# Patient Record
Sex: MALE | Race: ASIAN | NOT HISPANIC OR LATINO
[De-identification: names, ages, dates, MRNs, and addresses within clinical notes are randomized per-mention and may not be internally consistent; named-entity substitution may affect disease eponyms.]

---

## 2022-09-06 ENCOUNTER — APPOINTMENT (OUTPATIENT)
Dept: UROLOGY | Facility: CLINIC | Age: 87
End: 2022-09-06

## 2022-09-06 VITALS
TEMPERATURE: 97.5 F | HEIGHT: 64 IN | WEIGHT: 155 LBS | HEART RATE: 89 BPM | DIASTOLIC BLOOD PRESSURE: 67 MMHG | SYSTOLIC BLOOD PRESSURE: 126 MMHG | BODY MASS INDEX: 26.46 KG/M2

## 2022-09-06 DIAGNOSIS — Z86.39 PERSONAL HISTORY OF OTHER ENDOCRINE, NUTRITIONAL AND METABOLIC DISEASE: ICD-10-CM

## 2022-09-06 DIAGNOSIS — Z78.9 OTHER SPECIFIED HEALTH STATUS: ICD-10-CM

## 2022-09-06 PROBLEM — Z00.00 ENCOUNTER FOR PREVENTIVE HEALTH EXAMINATION: Status: ACTIVE | Noted: 2022-09-06

## 2022-09-06 PROCEDURE — 99204 OFFICE O/P NEW MOD 45 MIN: CPT

## 2022-09-06 RX ORDER — COVID-19 MOLECULAR TEST ASSAY
KIT MISCELLANEOUS
Qty: 8 | Refills: 0 | Status: ACTIVE | COMMUNITY
Start: 2022-05-25

## 2022-09-06 RX ORDER — METFORMIN HYDROCHLORIDE 850 MG/1
850 TABLET, COATED ORAL
Qty: 90 | Refills: 0 | Status: ACTIVE | COMMUNITY
Start: 2022-08-15

## 2022-09-06 RX ORDER — CIPROFLOXACIN HYDROCHLORIDE 500 MG/1
500 TABLET, FILM COATED ORAL
Qty: 14 | Refills: 0 | Status: ACTIVE | COMMUNITY
Start: 2022-04-27

## 2022-09-06 RX ORDER — FUROSEMIDE 20 MG/1
20 TABLET ORAL
Refills: 0 | Status: ACTIVE | COMMUNITY

## 2022-09-06 RX ORDER — LISINOPRIL 30 MG/1
TABLET ORAL
Refills: 0 | Status: ACTIVE | COMMUNITY

## 2022-09-06 RX ORDER — ROSUVASTATIN CALCIUM 5 MG/1
TABLET, FILM COATED ORAL
Refills: 0 | Status: ACTIVE | COMMUNITY

## 2022-09-06 RX ORDER — FUROSEMIDE 20 MG/1
20 TABLET ORAL
Qty: 90 | Refills: 0 | Status: ACTIVE | COMMUNITY
Start: 2022-08-15

## 2022-09-06 RX ORDER — BACLOFEN 10 MG/1
10 TABLET ORAL
Qty: 14 | Refills: 0 | Status: ACTIVE | COMMUNITY
Start: 2022-05-12

## 2022-09-06 RX ORDER — LISINOPRIL 10 MG/1
10 TABLET ORAL
Qty: 90 | Refills: 0 | Status: ACTIVE | COMMUNITY
Start: 2022-08-15

## 2022-09-06 RX ORDER — MELOXICAM 15 MG/1
15 TABLET ORAL
Qty: 14 | Refills: 0 | Status: ACTIVE | COMMUNITY
Start: 2022-05-24

## 2022-09-06 RX ORDER — METFORMIN ER 750 MG 750 MG/1
750 TABLET ORAL
Refills: 0 | Status: ACTIVE | COMMUNITY

## 2022-09-06 RX ORDER — GABAPENTIN 300 MG/1
300 CAPSULE ORAL
Qty: 30 | Refills: 0 | Status: ACTIVE | COMMUNITY
Start: 2022-08-15

## 2022-09-06 RX ORDER — ATORVASTATIN CALCIUM 20 MG/1
20 TABLET, FILM COATED ORAL
Qty: 90 | Refills: 0 | Status: ACTIVE | COMMUNITY
Start: 2022-08-15

## 2022-09-06 RX ORDER — SUB-Q INSULIN DEVICE, 40 UNIT
EACH MISCELLANEOUS
Qty: 30 | Refills: 0 | Status: ACTIVE | COMMUNITY
Start: 2022-08-11

## 2022-09-06 RX ORDER — INSULIN ASPART 100 [IU]/ML
100 INJECTION, SOLUTION INTRAVENOUS; SUBCUTANEOUS
Qty: 20 | Refills: 0 | Status: ACTIVE | COMMUNITY
Start: 2022-08-11

## 2022-09-06 RX ORDER — BLOOD SUGAR DIAGNOSTIC
STRIP MISCELLANEOUS
Qty: 300 | Refills: 0 | Status: ACTIVE | COMMUNITY
Start: 2022-08-15

## 2022-09-06 RX ORDER — TAMSULOSIN HYDROCHLORIDE 0.4 MG/1
0.4 CAPSULE ORAL
Qty: 90 | Refills: 0 | Status: ACTIVE | COMMUNITY
Start: 2022-08-15

## 2022-09-06 NOTE — ASSESSMENT
[FreeTextEntry1] : Very pleasant 87-year-old-gentleman who presents for evaluation of elevated PSA, BPH\par -We discussed the potential etiologies of an elevated PSA, including but not limited to prostate cancer, urinary tract infections, prostatitis, BPH, and recent ejaculation.\par -Repeat PSA\par -urine culture\par -We discussed the differences between prostate MRI and a prostate biopsy for evaluation for prostate cancer.  We discussed the risks and benefits of both a prostate biopsy versus a prostate MRI, including the limitations of both.  We discussed the benefit of obtaining an MRI prior to a prostate biopsy with the advantage of being able to do a transperineal fusion biopsy after MRI.  After thorough discussion of the risks and benefits of each he would like to proceed with a prostate MRI in anticipation of a fusion biopsy\par -We discussed normal age-adjusted PSA ranges\par -We discussed the option for observation given his age as well.  Patient would like to consider this and may elect to proceed with observation after results of PSA return

## 2022-09-06 NOTE — HISTORY OF PRESENT ILLNESS
[FreeTextEntry1] : Very pleasant 87-year-old gentleman who presents for evaluation of elevated PSA found on recent blood test to 8.5.  Reports that he has never been told about an elevated PSA in the past. No suprapubic pain. No dysuria.  No increased urinary frequency or urgency.  Nocturia x 2.  He takes tamsulosin.  He reports undergoing an in office prostate procedure in 2007 with another urologist. No history of urinary tract infections or renal impairment. No gross hematuria.  No aggravating or alleviating factors. No history of urinary tract instrumentation in the past.\par \par No family history of  malignancy, including prostate cancer.  No family history of nephrolithiasis.  Quit smoking 2002.

## 2022-09-07 LAB — PSA SERPL-MCNC: 10.4 NG/ML

## 2022-09-12 LAB — BACTERIA UR CULT: ABNORMAL

## 2022-09-12 RX ORDER — AMOXICILLIN AND CLAVULANATE POTASSIUM 875; 125 MG/1; MG/1
875-125 TABLET, COATED ORAL TWICE DAILY
Qty: 28 | Refills: 0 | Status: ACTIVE | COMMUNITY
Start: 2022-09-12 | End: 1900-01-01

## 2022-10-07 ENCOUNTER — APPOINTMENT (OUTPATIENT)
Dept: UROLOGY | Facility: CLINIC | Age: 87
End: 2022-10-07

## 2022-10-07 VITALS
HEIGHT: 64 IN | BODY MASS INDEX: 26.46 KG/M2 | DIASTOLIC BLOOD PRESSURE: 67 MMHG | SYSTOLIC BLOOD PRESSURE: 125 MMHG | TEMPERATURE: 98.2 F | HEART RATE: 65 BPM | WEIGHT: 155 LBS

## 2022-10-07 DIAGNOSIS — N39.0 URINARY TRACT INFECTION, SITE NOT SPECIFIED: ICD-10-CM

## 2022-10-07 PROCEDURE — 99213 OFFICE O/P EST LOW 20 MIN: CPT

## 2022-10-07 NOTE — HISTORY OF PRESENT ILLNESS
Admit on mild pathway  decadron  statin  AC  Hold on remdesivir due to transaminitis  Bronchodilators  Ambulate patient  [None] : None [FreeTextEntry1] : Mr. Gore is a very pleasant 87 year old man here today for history of elevated PSA, UTI\par He reports feeling well since he was here last.\par Reports that he feels better after finishing Augmentin.\par Denies any hematuria, dysuria or urinary retention.\par Has not yet had prostate MRI due to authorization not being obtained yet.

## 2022-10-07 NOTE — ASSESSMENT
[FreeTextEntry1] : Mr. Gore is a very pleasant 87 year old man here today for history of elevated PSA, UTI\par He reports feeling well since he was here last.\par Reports that he feels better after finishing Augmentin.\par Denies any hematuria, dysuria or urinary retention.\par Has not yet had prostate MRI due to authorization not being obtained yet.\par Repeat UC today.\par Prostate MRI.\par RTO in 3 weeks.

## 2022-10-10 ENCOUNTER — NON-APPOINTMENT (OUTPATIENT)
Age: 87
End: 2022-10-10

## 2022-10-10 LAB — BACTERIA UR CULT: NORMAL

## 2022-10-28 ENCOUNTER — NON-APPOINTMENT (OUTPATIENT)
Age: 87
End: 2022-10-28

## 2022-11-01 ENCOUNTER — APPOINTMENT (OUTPATIENT)
Dept: UROLOGY | Facility: CLINIC | Age: 87
End: 2022-11-01

## 2023-02-10 ENCOUNTER — APPOINTMENT (OUTPATIENT)
Dept: UROLOGY | Facility: CLINIC | Age: 88
End: 2023-02-10
Payer: MEDICARE

## 2023-02-10 DIAGNOSIS — N13.8 BENIGN PROSTATIC HYPERPLASIA WITH LOWER URINARY TRACT SYMPMS: ICD-10-CM

## 2023-02-10 DIAGNOSIS — N40.1 BENIGN PROSTATIC HYPERPLASIA WITH LOWER URINARY TRACT SYMPMS: ICD-10-CM

## 2023-02-10 PROCEDURE — 99214 OFFICE O/P EST MOD 30 MIN: CPT | Mod: 95

## 2023-02-10 NOTE — HISTORY OF PRESENT ILLNESS
[FreeTextEntry1] : The patient-doctor relationship has been established in a face to face fashion via real time video/audio HIPAA compliant communication using telemedicine software.  He has requested care to be assessed and treated through telemedicine. He understands that there maybe limitations in this process and that he may need further follow up care in the office and/or hospital setting.\par \par Very pleasant 87-year-old gentleman who presents for follow-up of elevated PSA.  He was found to have an elevated PSA of 10.7 from September 2022.  A urine culture was positive at that time.  He was treated with antibiotics.  Repeat urine culture was negative.  Subsequent prostate MRI demonstrated an overall suspicion score of PI-RADS 4 in the setting of a 54 cc prostate.  He presents today to discuss these results as well as further management options.

## 2023-02-10 NOTE — ASSESSMENT
[FreeTextEntry1] : Very pleasant 87-year-old gentleman who presents for follow-up of elevated PSA, BPH\par -Prostate MRI images from [facility] radiology reviewed demonstrating overall suspicion score of PI-RADS 4 in the setting of a 54 cc prostate\par -PSA 10.7\par -Repeat urine culture\par -I discussed the need to perform a transrectal ultrasound-guided prostate biopsy with the patient.  I reviewed the potential etiologies of an elevated PSA with the patient, including but not limited to prostate cancer, benign prostatic hyperplasia (BPH), inflammation of the prostate, urinary tract infection (UTI), older age, and sexual intercourse. I discussed the risks of a prostate biopsy with the patient, including but not limited to pain, rectal bleeding, blood in the urine and semen, difficulty or inability to urinate, and infection. We discussed the procedure itself, including the need to place a transrectal ultrasound probe in the rectum and take systematic biopsies of the prostate gland after providing a local anesthetic agent.  I explained the stef-procedural preparations that the patient will need to take, including self-administration of an enema the day of the biopsy. He wishes to proceed and we will schedule the biopsy for the near future.

## 2023-03-07 ENCOUNTER — NON-APPOINTMENT (OUTPATIENT)
Age: 88
End: 2023-03-07

## 2023-03-09 ENCOUNTER — OUTPATIENT (OUTPATIENT)
Dept: OUTPATIENT SERVICES | Facility: HOSPITAL | Age: 88
LOS: 1 days | End: 2023-03-09
Payer: COMMERCIAL

## 2023-03-09 ENCOUNTER — APPOINTMENT (OUTPATIENT)
Dept: UROLOGY | Facility: CLINIC | Age: 88
End: 2023-03-09
Payer: MEDICARE

## 2023-03-09 VITALS — DIASTOLIC BLOOD PRESSURE: 69 MMHG | SYSTOLIC BLOOD PRESSURE: 178 MMHG

## 2023-03-09 VITALS — DIASTOLIC BLOOD PRESSURE: 69 MMHG | SYSTOLIC BLOOD PRESSURE: 138 MMHG | HEART RATE: 80 BPM

## 2023-03-09 DIAGNOSIS — R35.0 FREQUENCY OF MICTURITION: ICD-10-CM

## 2023-03-09 DIAGNOSIS — R93.5 ABNORMAL FINDINGS ON DIAGNOSTIC IMAGING OF OTHER ABDOMINAL REGIONS, INCLUDING RETROPERITONEUM: ICD-10-CM

## 2023-03-09 PROCEDURE — 55700: CPT | Mod: 22

## 2023-03-09 PROCEDURE — 76942 ECHO GUIDE FOR BIOPSY: CPT | Mod: 26,59

## 2023-03-09 PROCEDURE — 76377 3D RENDER W/INTRP POSTPROCES: CPT | Mod: 26

## 2023-03-09 PROCEDURE — 55700: CPT

## 2023-03-09 PROCEDURE — 76942 ECHO GUIDE FOR BIOPSY: CPT | Mod: 59

## 2023-03-10 ENCOUNTER — NON-APPOINTMENT (OUTPATIENT)
Age: 88
End: 2023-03-10

## 2023-03-10 ENCOUNTER — INPATIENT (INPATIENT)
Facility: HOSPITAL | Age: 88
LOS: 0 days | Discharge: HOME | End: 2023-03-11
Attending: UROLOGY | Admitting: UROLOGY
Payer: MEDICARE

## 2023-03-10 VITALS
RESPIRATION RATE: 20 BRPM | HEART RATE: 121 BPM | OXYGEN SATURATION: 96 % | SYSTOLIC BLOOD PRESSURE: 101 MMHG | TEMPERATURE: 100 F | DIASTOLIC BLOOD PRESSURE: 53 MMHG

## 2023-03-10 DIAGNOSIS — R31.9 HEMATURIA, UNSPECIFIED: ICD-10-CM

## 2023-03-10 DIAGNOSIS — Z95.5 PRESENCE OF CORONARY ANGIOPLASTY IMPLANT AND GRAFT: Chronic | ICD-10-CM

## 2023-03-10 DIAGNOSIS — Z90.79 ACQUIRED ABSENCE OF OTHER GENITAL ORGAN(S): Chronic | ICD-10-CM

## 2023-03-10 DIAGNOSIS — Z98.890 OTHER SPECIFIED POSTPROCEDURAL STATES: Chronic | ICD-10-CM

## 2023-03-10 LAB
ALBUMIN SERPL ELPH-MCNC: 3.5 G/DL — SIGNIFICANT CHANGE UP (ref 3.3–5)
ALP SERPL-CCNC: 71 U/L — SIGNIFICANT CHANGE UP (ref 40–120)
ALT FLD-CCNC: 15 U/L — SIGNIFICANT CHANGE UP (ref 4–41)
ANION GAP SERPL CALC-SCNC: 10 MMOL/L — SIGNIFICANT CHANGE UP (ref 7–14)
APPEARANCE UR: ABNORMAL
APTT BLD: 26.6 SEC — LOW (ref 27–36.3)
AST SERPL-CCNC: 22 U/L — SIGNIFICANT CHANGE UP (ref 4–40)
BACTERIA # UR AUTO: ABNORMAL
BASOPHILS # BLD AUTO: 0.08 K/UL — SIGNIFICANT CHANGE UP (ref 0–0.2)
BASOPHILS NFR BLD AUTO: 0.5 % — SIGNIFICANT CHANGE UP (ref 0–2)
BILIRUB SERPL-MCNC: 1.2 MG/DL — SIGNIFICANT CHANGE UP (ref 0.2–1.2)
BILIRUB UR-MCNC: NEGATIVE — SIGNIFICANT CHANGE UP
BUN SERPL-MCNC: 30 MG/DL — HIGH (ref 7–23)
CALCIUM SERPL-MCNC: 9.1 MG/DL — SIGNIFICANT CHANGE UP (ref 8.4–10.5)
CHLORIDE SERPL-SCNC: 100 MMOL/L — SIGNIFICANT CHANGE UP (ref 98–107)
CO2 SERPL-SCNC: 23 MMOL/L — SIGNIFICANT CHANGE UP (ref 22–31)
COLOR SPEC: YELLOW — SIGNIFICANT CHANGE UP
CREAT SERPL-MCNC: 1.63 MG/DL — HIGH (ref 0.5–1.3)
DIFF PNL FLD: ABNORMAL
EGFR: 41 ML/MIN/1.73M2 — LOW
EOSINOPHIL # BLD AUTO: 0.12 K/UL — SIGNIFICANT CHANGE UP (ref 0–0.5)
EOSINOPHIL NFR BLD AUTO: 0.8 % — SIGNIFICANT CHANGE UP (ref 0–6)
EPI CELLS # UR: 0 /HPF — SIGNIFICANT CHANGE UP (ref 0–5)
FLUAV AG NPH QL: SIGNIFICANT CHANGE UP
FLUBV AG NPH QL: SIGNIFICANT CHANGE UP
GLUCOSE BLDC GLUCOMTR-MCNC: 209 MG/DL — HIGH (ref 70–99)
GLUCOSE BLDC GLUCOMTR-MCNC: 236 MG/DL — HIGH (ref 70–99)
GLUCOSE SERPL-MCNC: 273 MG/DL — HIGH (ref 70–99)
GLUCOSE UR QL: ABNORMAL
HCT VFR BLD CALC: 37 % — LOW (ref 39–50)
HGB BLD-MCNC: 11.2 G/DL — LOW (ref 13–17)
HYALINE CASTS # UR AUTO: 2 /LPF — SIGNIFICANT CHANGE UP (ref 0–7)
IANC: 13.72 K/UL — HIGH (ref 1.8–7.4)
IMM GRANULOCYTES NFR BLD AUTO: 0.4 % — SIGNIFICANT CHANGE UP (ref 0–0.9)
INR BLD: 1.26 RATIO — HIGH (ref 0.88–1.16)
KETONES UR-MCNC: ABNORMAL
LACTATE BLDV-MCNC: 1.6 MMOL/L — SIGNIFICANT CHANGE UP (ref 0.5–2)
LEUKOCYTE ESTERASE UR-ACNC: ABNORMAL
LYMPHOCYTES # BLD AUTO: 0.57 K/UL — LOW (ref 1–3.3)
LYMPHOCYTES # BLD AUTO: 3.7 % — LOW (ref 13–44)
MCHC RBC-ENTMCNC: 24.3 PG — LOW (ref 27–34)
MCHC RBC-ENTMCNC: 30.3 GM/DL — LOW (ref 32–36)
MCV RBC AUTO: 80.3 FL — SIGNIFICANT CHANGE UP (ref 80–100)
MONOCYTES # BLD AUTO: 0.95 K/UL — HIGH (ref 0–0.9)
MONOCYTES NFR BLD AUTO: 6.1 % — SIGNIFICANT CHANGE UP (ref 2–14)
NEUTROPHILS # BLD AUTO: 13.72 K/UL — HIGH (ref 1.8–7.4)
NEUTROPHILS NFR BLD AUTO: 88.5 % — HIGH (ref 43–77)
NITRITE UR-MCNC: NEGATIVE — SIGNIFICANT CHANGE UP
NRBC # BLD: 0 /100 WBCS — SIGNIFICANT CHANGE UP (ref 0–0)
NRBC # FLD: 0 K/UL — SIGNIFICANT CHANGE UP (ref 0–0)
PH UR: 6.5 — SIGNIFICANT CHANGE UP (ref 5–8)
PLATELET # BLD AUTO: 335 K/UL — SIGNIFICANT CHANGE UP (ref 150–400)
POTASSIUM SERPL-MCNC: 4.9 MMOL/L — SIGNIFICANT CHANGE UP (ref 3.5–5.3)
POTASSIUM SERPL-SCNC: 4.9 MMOL/L — SIGNIFICANT CHANGE UP (ref 3.5–5.3)
PROT SERPL-MCNC: 6.6 G/DL — SIGNIFICANT CHANGE UP (ref 6–8.3)
PROT UR-MCNC: ABNORMAL
PROTHROM AB SERPL-ACNC: 14.6 SEC — HIGH (ref 10.5–13.4)
RBC # BLD: 4.61 M/UL — SIGNIFICANT CHANGE UP (ref 4.2–5.8)
RBC # FLD: 16.1 % — HIGH (ref 10.3–14.5)
RBC CASTS # UR COMP ASSIST: 606 /HPF — HIGH (ref 0–4)
RSV RNA NPH QL NAA+NON-PROBE: SIGNIFICANT CHANGE UP
SARS-COV-2 RNA SPEC QL NAA+PROBE: SIGNIFICANT CHANGE UP
SODIUM SERPL-SCNC: 133 MMOL/L — LOW (ref 135–145)
SP GR SPEC: 1.01 — SIGNIFICANT CHANGE UP (ref 1.01–1.05)
TROPONIN T, HIGH SENSITIVITY RESULT: 34 NG/L — SIGNIFICANT CHANGE UP
UROBILINOGEN FLD QL: SIGNIFICANT CHANGE UP
WBC # BLD: 15.5 K/UL — HIGH (ref 3.8–10.5)
WBC # FLD AUTO: 15.5 K/UL — HIGH (ref 3.8–10.5)
WBC UR QL: 253 /HPF — HIGH (ref 0–5)

## 2023-03-10 PROCEDURE — 71046 X-RAY EXAM CHEST 2 VIEWS: CPT | Mod: 26

## 2023-03-10 PROCEDURE — 99222 1ST HOSP IP/OBS MODERATE 55: CPT

## 2023-03-10 PROCEDURE — 99285 EMERGENCY DEPT VISIT HI MDM: CPT

## 2023-03-10 RX ORDER — CEFTRIAXONE 500 MG/1
1000 INJECTION, POWDER, FOR SOLUTION INTRAMUSCULAR; INTRAVENOUS ONCE
Refills: 0 | Status: DISCONTINUED | OUTPATIENT
Start: 2023-03-10 | End: 2023-03-10

## 2023-03-10 RX ORDER — LISINOPRIL 2.5 MG/1
5 TABLET ORAL DAILY
Refills: 0 | Status: DISCONTINUED | OUTPATIENT
Start: 2023-03-10 | End: 2023-03-11

## 2023-03-10 RX ORDER — GLUCAGON INJECTION, SOLUTION 0.5 MG/.1ML
1 INJECTION, SOLUTION SUBCUTANEOUS ONCE
Refills: 0 | Status: DISCONTINUED | OUTPATIENT
Start: 2023-03-10 | End: 2023-03-11

## 2023-03-10 RX ORDER — INSULIN LISPRO 100/ML
5 VIAL (ML) SUBCUTANEOUS
Refills: 0 | Status: DISCONTINUED | OUTPATIENT
Start: 2023-03-10 | End: 2023-03-11

## 2023-03-10 RX ORDER — ASPIRIN/CALCIUM CARB/MAGNESIUM 324 MG
81 TABLET ORAL DAILY
Refills: 0 | Status: DISCONTINUED | OUTPATIENT
Start: 2023-03-10 | End: 2023-03-11

## 2023-03-10 RX ORDER — SODIUM CHLORIDE 9 MG/ML
1000 INJECTION, SOLUTION INTRAVENOUS
Refills: 0 | Status: DISCONTINUED | OUTPATIENT
Start: 2023-03-10 | End: 2023-03-11

## 2023-03-10 RX ORDER — INSULIN LISPRO 100/ML
VIAL (ML) SUBCUTANEOUS
Refills: 0 | Status: DISCONTINUED | OUTPATIENT
Start: 2023-03-10 | End: 2023-03-11

## 2023-03-10 RX ORDER — CIPROFLOXACIN HYDROCHLORIDE 500 MG/1
500 TABLET, FILM COATED ORAL
Qty: 14 | Refills: 0 | Status: ACTIVE | COMMUNITY
Start: 2023-03-10 | End: 1900-01-01

## 2023-03-10 RX ORDER — DEXTROSE 50 % IN WATER 50 %
25 SYRINGE (ML) INTRAVENOUS ONCE
Refills: 0 | Status: DISCONTINUED | OUTPATIENT
Start: 2023-03-10 | End: 2023-03-11

## 2023-03-10 RX ORDER — TAMSULOSIN HYDROCHLORIDE 0.4 MG/1
1 CAPSULE ORAL
Qty: 0 | Refills: 0 | DISCHARGE

## 2023-03-10 RX ORDER — LISINOPRIL 2.5 MG/1
1 TABLET ORAL
Qty: 0 | Refills: 0 | DISCHARGE

## 2023-03-10 RX ORDER — DEXTROSE 50 % IN WATER 50 %
15 SYRINGE (ML) INTRAVENOUS ONCE
Refills: 0 | Status: DISCONTINUED | OUTPATIENT
Start: 2023-03-10 | End: 2023-03-11

## 2023-03-10 RX ORDER — ASPIRIN/CALCIUM CARB/MAGNESIUM 324 MG
0 TABLET ORAL
Qty: 0 | Refills: 0 | DISCHARGE

## 2023-03-10 RX ORDER — ACETAMINOPHEN 500 MG
975 TABLET ORAL ONCE
Refills: 0 | Status: COMPLETED | OUTPATIENT
Start: 2023-03-10 | End: 2023-03-10

## 2023-03-10 RX ORDER — INSULIN GLARGINE 100 [IU]/ML
16 INJECTION, SOLUTION SUBCUTANEOUS ONCE
Refills: 0 | Status: COMPLETED | OUTPATIENT
Start: 2023-03-10 | End: 2023-03-10

## 2023-03-10 RX ORDER — HEPARIN SODIUM 5000 [USP'U]/ML
5000 INJECTION INTRAVENOUS; SUBCUTANEOUS EVERY 8 HOURS
Refills: 0 | Status: DISCONTINUED | OUTPATIENT
Start: 2023-03-10 | End: 2023-03-11

## 2023-03-10 RX ORDER — METFORMIN HYDROCHLORIDE 850 MG/1
850 TABLET ORAL ONCE
Refills: 0 | Status: DISCONTINUED | OUTPATIENT
Start: 2023-03-10 | End: 2023-03-10

## 2023-03-10 RX ORDER — INSULIN ASPART 100 [IU]/ML
6 INJECTION, SOLUTION SUBCUTANEOUS
Qty: 0 | Refills: 0 | DISCHARGE

## 2023-03-10 RX ORDER — CEFTRIAXONE 500 MG/1
1000 INJECTION, POWDER, FOR SOLUTION INTRAMUSCULAR; INTRAVENOUS ONCE
Refills: 0 | Status: COMPLETED | OUTPATIENT
Start: 2023-03-10 | End: 2023-03-10

## 2023-03-10 RX ORDER — SIMVASTATIN 20 MG/1
1 TABLET, FILM COATED ORAL
Qty: 0 | Refills: 0 | DISCHARGE

## 2023-03-10 RX ORDER — SIMVASTATIN 20 MG/1
20 TABLET, FILM COATED ORAL AT BEDTIME
Refills: 0 | Status: DISCONTINUED | OUTPATIENT
Start: 2023-03-10 | End: 2023-03-11

## 2023-03-10 RX ORDER — TAMSULOSIN HYDROCHLORIDE 0.4 MG/1
0.4 CAPSULE ORAL AT BEDTIME
Refills: 0 | Status: DISCONTINUED | OUTPATIENT
Start: 2023-03-10 | End: 2023-03-11

## 2023-03-10 RX ORDER — DEXTROSE 50 % IN WATER 50 %
12.5 SYRINGE (ML) INTRAVENOUS ONCE
Refills: 0 | Status: DISCONTINUED | OUTPATIENT
Start: 2023-03-10 | End: 2023-03-11

## 2023-03-10 RX ORDER — SODIUM CHLORIDE 9 MG/ML
1000 INJECTION INTRAMUSCULAR; INTRAVENOUS; SUBCUTANEOUS ONCE
Refills: 0 | Status: COMPLETED | OUTPATIENT
Start: 2023-03-10 | End: 2023-03-10

## 2023-03-10 RX ORDER — ACETAMINOPHEN 500 MG
1000 TABLET ORAL EVERY 6 HOURS
Refills: 0 | Status: DISCONTINUED | OUTPATIENT
Start: 2023-03-10 | End: 2023-03-11

## 2023-03-10 RX ORDER — METFORMIN HYDROCHLORIDE 850 MG/1
1 TABLET ORAL
Qty: 0 | Refills: 0 | DISCHARGE

## 2023-03-10 RX ORDER — INSULIN GLARGINE 100 [IU]/ML
20 INJECTION, SOLUTION SUBCUTANEOUS
Qty: 0 | Refills: 0 | DISCHARGE

## 2023-03-10 RX ADMIN — Medication 1000 MILLIGRAM(S): at 21:21

## 2023-03-10 RX ADMIN — SIMVASTATIN 20 MILLIGRAM(S): 20 TABLET, FILM COATED ORAL at 21:21

## 2023-03-10 RX ADMIN — TAMSULOSIN HYDROCHLORIDE 0.4 MILLIGRAM(S): 0.4 CAPSULE ORAL at 21:21

## 2023-03-10 RX ADMIN — Medication 975 MILLIGRAM(S): at 17:37

## 2023-03-10 RX ADMIN — CEFTRIAXONE 100 MILLIGRAM(S): 500 INJECTION, POWDER, FOR SOLUTION INTRAMUSCULAR; INTRAVENOUS at 18:19

## 2023-03-10 RX ADMIN — Medication 975 MILLIGRAM(S): at 15:20

## 2023-03-10 RX ADMIN — INSULIN GLARGINE 16 UNIT(S): 100 INJECTION, SOLUTION SUBCUTANEOUS at 21:17

## 2023-03-10 RX ADMIN — SODIUM CHLORIDE 1000 MILLILITER(S): 9 INJECTION INTRAMUSCULAR; INTRAVENOUS; SUBCUTANEOUS at 14:49

## 2023-03-10 RX ADMIN — HEPARIN SODIUM 5000 UNIT(S): 5000 INJECTION INTRAVENOUS; SUBCUTANEOUS at 21:17

## 2023-03-10 RX ADMIN — SODIUM CHLORIDE 125 MILLILITER(S): 9 INJECTION, SOLUTION INTRAVENOUS at 17:19

## 2023-03-10 RX ADMIN — Medication 2: at 21:18

## 2023-03-10 RX ADMIN — Medication 81 MILLIGRAM(S): at 19:19

## 2023-03-10 NOTE — H&P ADULT - NSHPLABSRESULTS_GEN_ALL_CORE
11.2   15.50 )-----------( 335      ( 10 Mar 2023 15:00 )             37.0   03-10    133<L>  |  100  |  30<H>  ----------------------------<  273<H>  4.9   |  23  |  1.63<H>    Ca    9.1      10 Mar 2023 15:00    TPro  6.6  /  Alb  3.5  /  TBili  1.2  /  DBili  x   /  AST  22  /  ALT  15  /  AlkPhos  71  03-10    UCx: pending  BCx: pending

## 2023-03-10 NOTE — CHART NOTE - NSCHARTNOTEFT_GEN_A_CORE
Pt voided - unwitnessed but he claims no hematuria or difficulty voiding.    He also reports adequate stream and no sensation of incomplete emptying.  PVR reveals 50cc

## 2023-03-10 NOTE — H&P ADULT - NSHPPHYSICALEXAM_GEN_ALL_CORE
General: No acute distress  Resp: Symmetric chest rise, no respiratory distress  Cardiac: Regular rate and rhythm  GI: Soft, nontender, non-distended  : Patient with dark yellow urine.  Perineum: No evidence of erythema, induration, or other skin lesions at site of TP biopsy entry.   Extremities: Patient able to move all four extremities spontaneously

## 2023-03-10 NOTE — PATIENT PROFILE ADULT - FALL HARM RISK - HARM RISK INTERVENTIONS

## 2023-03-10 NOTE — H&P ADULT - ASSESSMENT
86yo M who had a transperineal biopsy with Dr. Mendieta on 3/9/23 presenting with rigors, subjective fevers, and malaise likely sepsis    Plan  - F/u BCx and UCX  - ED labs reviewed significant for leukocytosis  - Ceftriaxone for antibiotics after patient has given urine culture  - PVR  - Endocrine consult for diabetes comanagement  - Regular diet  - OOB  - Admit to Dr. Mendieta

## 2023-03-10 NOTE — CHART NOTE - NSCHARTNOTEFT_GEN_A_CORE
Brief Endocrine Note;    This is an 88 yo M /w a PMH of DM2 on an insulin pump who presents with fevers and chills after prostate biopsy yesterday.    Endocrinology consulted for insulin pump management    Per Urology resident, patient is AxOx3, and able to manage pump effectively.    Noted . Requested resident to ask patient to give correction bolus    Recommendations:  continue with insulin pump  if FSG continue to rise, can transition to basal bolus insulin  please check HbA1c  FSG before meals and before bedtime      Please call with any questions    Full consult tomorrow    Stevan Shannon MD  Endocrine Fellow  Can be reached via teams. For follow up questions, discharge recommendations, or new consults, please call answering service at [phone] (weekdays); [phone] (nights/weekends) Brief Endocrine Note;    This is an 86 yo M /w a PMH of DM2 on an insulin pump who presents with fevers and chills after prostate biopsy yesterday.    Endocrinology consulted for insulin pump management.    Patient uses V-GO with novolog insulin. He takes 20 units basal and 6 units pre-meal. This needs to be refilled daily. Takes metformin 750mg ER daily    Patient and son at bedside agreeable to stop V-GO and transition to basal-bolus  Patient has DM2 since 1956. Denies prior complications of nephropathy, retinopathy, or neuropathy. Reports A1c when he last saw his endocrinologist Dr. Riggs was 8%.    Noted     Addended Plan:  -please stop the V-GO  -give lantus 16 units stat - patient likely has low c-peptide   -start admelog 5 units before meals  -low admelog correction scale before meals and before bedtime  -FSG before meals and before bedtime  -carb consistent diet  -hypoglycemia protocol in place if needed    Recommendations discussed with Urology provider on call    Please call with any questions    Full consult tomorrow    Stevan Shannon MD  Endocrine Fellow  Can be reached via teams. For follow up questions, discharge recommendations, or new consults, please call answering service at [phone] (weekdays); [phone] (nights/weekends)

## 2023-03-10 NOTE — ED ADULT TRIAGE NOTE - ESI TRIAGE ACUITY LEVEL, MLM
Refill request is for a maintenance medication and has met the criteria specified in the Ambulatory Medication Refill Standing Order for eligibility, visits, laboratory, alerts and was sent to the requested pharmacy. 2

## 2023-03-10 NOTE — ED ADULT NURSE NOTE - OBJECTIVE STATEMENT
Patient received in stretcher. AOX4. Respirations even and unlabored. Spontaneous movement of all extremities noted. Presents to ER c/o fever and blood in urine  x1 day. As per patient he had a prostate procedure yesterday afternoon experienced blood in urine after procedure but fevers starting today. Patient can not recall how high fever was but reports taking Tylenol prior to arrival to ER. IV placed. Labs drawn. Fluids infusing Comfort and safety maintained. All current care needs met. Care plan continued Abdelrahman CALVILLO

## 2023-03-10 NOTE — ED PROVIDER NOTE - ATTENDING CONTRIBUTION TO CARE
Dr. Michelle:  I have personally performed a face to face bedside history and physical examination of this patient. I have discussed the history, examination, review of systems, assessment and plan of management with the resident. I have reviewed the electronic medical record and amended it to reflect my history, review of systems, physical exam, assessment and plan.    87M s/p biopsy with urology yesterday presents with subjective fever/chills and hematuria.  Called his urologist Dr Mendieta, who recommended ED evaluation.    Exam:  - nad   -borderline tachy  - ctab  - abd soft ntnd    A/P  - fever/chills, hematuria; eval for infection  - sepsis labs, UA & culture  - urology consult Dr. Michelle:  I have personally performed a face to face bedside history and physical examination of this patient. I have discussed the history, examination, review of systems, assessment and plan of management with the resident. I have reviewed the electronic medical record and amended it to reflect my history, review of systems, physical exam, assessment and plan.    87M s/p prostate biopsy with urology yesterday presents with subjective fever/chills and hematuria.  Called his urologist Dr Mendieta, who recommended ED evaluation.    Exam:  - nad   -borderline tachy  - ctab  - abd soft ntnd    A/P  - fever/chills, hematuria; eval for infection  - sepsis labs, UA & culture  - urology consult

## 2023-03-10 NOTE — ED PROVIDER NOTE - CLINICAL SUMMARY MEDICAL DECISION MAKING FREE TEXT BOX
87M s/p prostate biopsy with urology yesterday presents with subjective fever/chills and hematuria.  Called his urologist Dr Mendieta, who recommended ED evaluation.    Exam:  - nad, borderline tachy, ctab, abd soft ntnd    A/P  - fever/chills, hematuria; eval for infection  - sepsis labs, UA & culture  - urology consult

## 2023-03-10 NOTE — H&P ADULT - NSICDXFAMILYHX_GEN_ALL_CORE_FT
Patient returned ACT and scored a 23, I will close the encounter per protocol.  Cat Caldwell,      FAMILY HISTORY:  No pertinent family history in first degree relatives

## 2023-03-10 NOTE — H&P ADULT - HISTORY OF PRESENT ILLNESS
Patient is a 86yo M who had a transperineal biopsy with Dr. Mendieta on 3/9/23 presenting with rigors, subjective fevers, and malaise at home since 9pm yesterday. Patient also endorsing hematuria since the procedure. In the emergency room patient had labs which were significant for leukocytosis (15) and vital signs significant for tachycardia to 120. Patient denies any other urinary symptoms including frequency, urgency, dysuria and retention.

## 2023-03-10 NOTE — H&P ADULT - NSICDXPASTSURGICALHX_GEN_ALL_CORE_FT
PAST SURGICAL HISTORY:  S/P coronary artery stent placement     S/P inguinal hernia repair     S/P TURP

## 2023-03-11 ENCOUNTER — TRANSCRIPTION ENCOUNTER (OUTPATIENT)
Age: 88
End: 2023-03-11

## 2023-03-11 VITALS
DIASTOLIC BLOOD PRESSURE: 59 MMHG | RESPIRATION RATE: 17 BRPM | SYSTOLIC BLOOD PRESSURE: 129 MMHG | TEMPERATURE: 99 F | HEART RATE: 82 BPM | OXYGEN SATURATION: 97 %

## 2023-03-11 DIAGNOSIS — I10 ESSENTIAL (PRIMARY) HYPERTENSION: ICD-10-CM

## 2023-03-11 DIAGNOSIS — E11.9 TYPE 2 DIABETES MELLITUS WITHOUT COMPLICATIONS: ICD-10-CM

## 2023-03-11 DIAGNOSIS — E78.5 HYPERLIPIDEMIA, UNSPECIFIED: ICD-10-CM

## 2023-03-11 LAB
A1C WITH ESTIMATED AVERAGE GLUCOSE RESULT: 8 % — HIGH (ref 4–5.6)
ANION GAP SERPL CALC-SCNC: 7 MMOL/L — SIGNIFICANT CHANGE UP (ref 7–14)
BUN SERPL-MCNC: 25 MG/DL — HIGH (ref 7–23)
CALCIUM SERPL-MCNC: 8.3 MG/DL — LOW (ref 8.4–10.5)
CHLORIDE SERPL-SCNC: 104 MMOL/L — SIGNIFICANT CHANGE UP (ref 98–107)
CO2 SERPL-SCNC: 24 MMOL/L — SIGNIFICANT CHANGE UP (ref 22–31)
CREAT SERPL-MCNC: 1.51 MG/DL — HIGH (ref 0.5–1.3)
EGFR: 45 ML/MIN/1.73M2 — LOW
ESTIMATED AVERAGE GLUCOSE: 183 — SIGNIFICANT CHANGE UP
GLUCOSE BLDC GLUCOMTR-MCNC: 259 MG/DL — HIGH (ref 70–99)
GLUCOSE BLDC GLUCOMTR-MCNC: 287 MG/DL — HIGH (ref 70–99)
GLUCOSE BLDC GLUCOMTR-MCNC: 288 MG/DL — HIGH (ref 70–99)
GLUCOSE SERPL-MCNC: 314 MG/DL — HIGH (ref 70–99)
HCT VFR BLD CALC: 36.7 % — LOW (ref 39–50)
HGB BLD-MCNC: 11 G/DL — LOW (ref 13–17)
MCHC RBC-ENTMCNC: 24.7 PG — LOW (ref 27–34)
MCHC RBC-ENTMCNC: 30 GM/DL — LOW (ref 32–36)
MCV RBC AUTO: 82.5 FL — SIGNIFICANT CHANGE UP (ref 80–100)
NRBC # BLD: 0 /100 WBCS — SIGNIFICANT CHANGE UP (ref 0–0)
NRBC # FLD: 0 K/UL — SIGNIFICANT CHANGE UP (ref 0–0)
PLATELET # BLD AUTO: 288 K/UL — SIGNIFICANT CHANGE UP (ref 150–400)
POTASSIUM SERPL-MCNC: 4.6 MMOL/L — SIGNIFICANT CHANGE UP (ref 3.5–5.3)
POTASSIUM SERPL-SCNC: 4.6 MMOL/L — SIGNIFICANT CHANGE UP (ref 3.5–5.3)
RBC # BLD: 4.45 M/UL — SIGNIFICANT CHANGE UP (ref 4.2–5.8)
RBC # FLD: 16.5 % — HIGH (ref 10.3–14.5)
SODIUM SERPL-SCNC: 135 MMOL/L — SIGNIFICANT CHANGE UP (ref 135–145)
WBC # BLD: 9.94 K/UL — SIGNIFICANT CHANGE UP (ref 3.8–10.5)
WBC # FLD AUTO: 9.94 K/UL — SIGNIFICANT CHANGE UP (ref 3.8–10.5)

## 2023-03-11 PROCEDURE — 99222 1ST HOSP IP/OBS MODERATE 55: CPT | Mod: GC

## 2023-03-11 PROCEDURE — 99232 SBSQ HOSP IP/OBS MODERATE 35: CPT

## 2023-03-11 RX ORDER — CEFPODOXIME PROXETIL 100 MG
1 TABLET ORAL
Qty: 20 | Refills: 0
Start: 2023-03-11 | End: 2023-03-20

## 2023-03-11 RX ORDER — ACETAMINOPHEN 500 MG
2 TABLET ORAL
Qty: 0 | Refills: 0 | DISCHARGE
Start: 2023-03-11

## 2023-03-11 RX ORDER — CEFPODOXIME PROXETIL 100 MG
1 TABLET ORAL
Qty: 28 | Refills: 0
Start: 2023-03-11 | End: 2023-03-24

## 2023-03-11 RX ORDER — LACTOBACILLUS ACIDOPHILUS 100MM CELL
1 CAPSULE ORAL
Qty: 28 | Refills: 0
Start: 2023-03-11 | End: 2023-03-24

## 2023-03-11 RX ADMIN — HEPARIN SODIUM 5000 UNIT(S): 5000 INJECTION INTRAVENOUS; SUBCUTANEOUS at 13:19

## 2023-03-11 RX ADMIN — Medication 5 UNIT(S): at 11:36

## 2023-03-11 RX ADMIN — Medication 3: at 17:06

## 2023-03-11 RX ADMIN — Medication 5 UNIT(S): at 17:04

## 2023-03-11 RX ADMIN — Medication 3: at 11:35

## 2023-03-11 RX ADMIN — LISINOPRIL 5 MILLIGRAM(S): 2.5 TABLET ORAL at 06:42

## 2023-03-11 RX ADMIN — Medication 81 MILLIGRAM(S): at 13:19

## 2023-03-11 RX ADMIN — HEPARIN SODIUM 5000 UNIT(S): 5000 INJECTION INTRAVENOUS; SUBCUTANEOUS at 06:41

## 2023-03-11 RX ADMIN — Medication 3: at 08:06

## 2023-03-11 RX ADMIN — Medication 5 UNIT(S): at 08:06

## 2023-03-11 NOTE — DISCHARGE NOTE PROVIDER - NSDCCPCAREPLAN_GEN_ALL_CORE_FT
PRINCIPAL DISCHARGE DIAGNOSIS  Diagnosis: Prostatitis  Assessment and Plan of Treatment: Take antibiotic until finished.  Avoid constipation, no heavy lifting. You may occasionally have blood tinged urine.  Dr. Mendieta's office will call you with a follow up appointment.  Call the office if you have fever greater than 101, difficulty urinating, or grossly bloody urine,  pain not relieved with acetaminophen, nausea/vomiting.        SECONDARY DISCHARGE DIAGNOSES  Diagnosis: Hypertension  Assessment and Plan of Treatment: Continue current home medications and follow up with your primary care provider      Diagnosis: Hyperlipidemia  Assessment and Plan of Treatment: Continue current home medications and follow up with your primary care provider      Diagnosis: DM2 (diabetes mellitus, type 2)  Assessment and Plan of Treatment: Resume your metformin and V-Go and follow up with your endocrinologist

## 2023-03-11 NOTE — CONSULT NOTE ADULT - REASON FOR ADMISSION
Infection 2/2 to TP biopsy
No. KYLIE screening performed.  STOP BANG Legend: 0-2 = LOW Risk; 3-4 = INTERMEDIATE Risk; 5-8 = HIGH Risk

## 2023-03-11 NOTE — DISCHARGE NOTE PROVIDER - CARE PROVIDER_API CALL
Brennon Mendieta)  Urology  [address] [place] [address], [place] [address]  Phone: [phone]  Fax: [phone]  Follow Up Time:

## 2023-03-11 NOTE — PROGRESS NOTE ADULT - REASON FOR ADMISSION
Infection 2/2 to TP biopsy Infection 2/2 to transperineal prostate biopsy likely infection after transperineal prostate biopsy

## 2023-03-11 NOTE — DISCHARGE NOTE PROVIDER - HOSPITAL COURSE
88yo M s/p transperineal prostate biopsy with Dr. Mendieta on 3/9/23 presenting with rigors, subjective fevers, malaise, hematuria at home since 9pm yesterday. WBC (15) and tachycardia to 120 with stable BP and T max 100.1.  Cultures drawn, pt started on CTX and admitted.     3/11: remains afebrile, voiding well, PVR 50 yellow urine, endo consult obtained as pt uses V-GO, inpt recs appreciated.  Pt d/c on vantin x 14 days to f/u with Dr. Mendieta

## 2023-03-11 NOTE — DISCHARGE NOTE NURSING/CASE MANAGEMENT/SOCIAL WORK - NSDCPEFALRISK_GEN_ALL_CORE
done For information on Fall & Injury Prevention, visit: [email or web address] OR  [email or web address] OR  https://www.cdc.gov/steadi/patient.html

## 2023-03-11 NOTE — DISCHARGE NOTE NURSING/CASE MANAGEMENT/SOCIAL WORK - PATIENT PORTAL LINK FT
You can access the FollowMyHealth Patient Portal offered by [facility] by registering at the following website: [email or web address]. By joining [facility]’s FollowMyHealth portal, you will also be able to view your health information using other applications (apps) compatible with our system.

## 2023-03-11 NOTE — DISCHARGE NOTE NURSING/CASE MANAGEMENT/SOCIAL WORK - NSDCPNINST_GEN_ALL_CORE
Follow-up with DR. Renteria as instructed, take medications as prescribed, Notify for any fever 100.5 and greater, sign and symptoms of fever, ie lethargy, chills, inability to urinate or bloody urine, persistent nausea or vomit. No heavy lifting, no straining, Follow-up with your PCP regarding recent hospitalization and continue to monitor blood glucose

## 2023-03-11 NOTE — PROGRESS NOTE ADULT - SUBJECTIVE AND OBJECTIVE BOX
Overnight events:  None, remained afebrile    Subjective:  Pt offers no complaints, voiding well    Objective:    Vital signs  T(C): , Max: 37.8 (03-10-23 @ 14:50)  HR: 82 (03-11-23 @ 09:22)  BP: 121/58 (03-11-23 @ 09:22)  SpO2: 97% (03-11-23 @ 09:22)  Wt(kg): --    Output   Void: 600   03-11 @ 06:01  -  03-11 @ 09:44  --------------------------------------------------------  IN: 250 mL / OUT: 600 mL / NET: -350 mL        Gen: NAD  Abd: soft, nontender      Labs                        11.0   9.94  )-----------( 288      ( 11 Mar 2023 05:28 )             36.7     11 Mar 2023 05:28    135    |  104    |  25     ----------------------------<  314    4.6     |  24     |  1.51     Ca    8.3        11 Mar 2023 05:28        Urine Cx: p  Blood Cx: p

## 2023-03-11 NOTE — DISCHARGE NOTE PROVIDER - NSDCMRMEDTOKEN_GEN_ALL_CORE_FT
acetaminophen 500 mg oral tablet: 2 tab(s) orally every 6 hours as needed for pain  aspirin 81 mg oral tablet:   cefpodoxime 200 mg oral tablet: 1 tab(s) orally every 12 hours   insulin aspart 100 units/mL subcutaneous solution: 6 unit(s) subcutaneous 3 times a day (before meals)  lactobacillus acidophilus oral capsule: 1 cap(s) orally 2 times a day   Lantus 100 units/mL subcutaneous solution: 20 unit(s) subcutaneous once a day  lisinopril 5 mg oral tablet: 1 tab(s) orally once a day  metFORMIN 750 mg oral tablet, extended release: 1 tab(s) orally once a day  simvastatin 20 mg oral tablet: 1 tab(s) orally once a day (at bedtime)  tamsulosin 0.4 mg oral capsule: 1 cap(s) orally once a day

## 2023-03-11 NOTE — CONSULT NOTE ADULT - ATTENDING COMMENTS
Agree with assessment and plan as above by Dr. Shannon. Reviewed all pertinent labs, glucose values, and imaging studies. Modifications made as indicated above. Start basal bolus regimen as above while in-patient. Can d/c on V-GO with outpatient endocrine follow-up.    Jared Rahman D.O  [phone]

## 2023-03-11 NOTE — PROGRESS NOTE ADULT - ATTENDING COMMENTS
patient with significant clinical improvement overnight  fevers/chills resolved  discharge with abx (vantin) x 10 days

## 2023-03-11 NOTE — PROGRESS NOTE ADULT - ASSESSMENT
86yo M s/p transperineal prostate biopsy with Dr. Mendieta on 3/9/23 presenting with rigors, subjective fevers, malaise, hematuria at home since 9pm yesterday. WBC (15) and tachycardia to 120 with stable BP and T max 100.1.  Cultures drawn, pt started on CTX and admitted.     3/11: 88yo M s/p transperineal prostate biopsy with Dr. Menideta on 3/9/23 presenting with rigors, subjective fevers, malaise, hematuria at home since 9pm yesterday. WBC (15) and tachycardia to 120 with stable BP and T max 100.1.  Cultures drawn, pt started on CTX and admitted.     3/11: remains afebrile, voiding well, PVR 50 yellow urine, endo consult obtained as pt uses V-GO    Plan:  -AM labs reviewed  -endo recs appreciated, f/u additional recs  -f/u cultures  -continue CTX  -f/u medicine  -DVT prophy, IS, OOB, ambulate   88yo M s/p transperineal prostate biopsy with Dr. Mendieta on 3/9/23 presenting with rigors, subjective fevers, malaise, hematuria at home since 9pm yesterday. WBC (15) and tachycardia to 120 with stable BP and T max 100.1.  Cultures drawn, pt started on CTX and admitted.     3/11: remains afebrile, voiding well, PVR 50 yellow urine, endo consult obtained as pt uses V-GO    Plan:  -AM labs reviewed  -endo recs appreciated, f/u additional recs  -f/u medicine  -f/u cultures  -continue CTX  -f/u medicine  -DVT prophy, IS, OOB, ambulate

## 2023-03-11 NOTE — CONSULT NOTE ADULT - ASSESSMENT
This is an 88 yo M /w a PMh of DM2 who presents for fevers after recent transperineal biopsy. Endocrinology consulted for management of DM2    #DM2  -increase lantus to 20 units tonight  -increase admelog to 7 units before meals  -c/w low correction admelog scale before meals and before bedtime  -FSG before meals and before bedtime  -FSG goal 100-180  carb consistent diet  -hypoglycemia protocol in place if needed  -HbA1c 8% is at goal for this patient    #Discharge  -resume V GO. Patient and son informed that timing of discharge and last does of lantus will impact time to restart the V-GO  -follow up with outpatient endocrinologist Dr. Riggs    #HTN  -c/w lisinopril 5mg daily    #HLD  -c/w simvastatin 20mg daily    Case discussed with Dr. Lacey Shannon MD  Endocrine Fellow  Can be reached via teams. For follow up questions, discharge recommendations, or new consults, please call answering service at [phone] (weekdays); [phone] (nights/weekends) This is an 88 yo M /w a PMh of DM2 who presents for fevers after recent transperineal biopsy. Endocrinology consulted for management of DM2    #DM2  -increase lantus to 20 units tonight  -increase admelog to 7 units before meals  -c/w low correction admelog scale before meals and before bedtime  -FSG before meals and before bedtime  -FSG goal 100-180  carb consistent diet  -hypoglycemia protocol in place if needed  -HbA1c 8% is at goal for this patient    #Discharge  -resume V GO. Patient and son informed that timing of discharge and last does of lantus will impact time to restart the V-GO. Can resume V-GO at home overnight at 10PM and replace in morning as per usual schedule  -follow up with outpatient endocrinologist Dr. Riggs    #HTN  -c/w lisinopril 5mg daily    #HLD  -c/w simvastatin 20mg daily    Case discussed with Dr. Lacey Shannon MD  Endocrine Fellow  Can be reached via teams. For follow up questions, discharge recommendations, or new consults, please call answering service at [phone] (weekdays); [phone] (nights/weekends)

## 2023-03-11 NOTE — CONSULT NOTE ADULT - SUBJECTIVE AND OBJECTIVE BOX
HPI:  Patient is a 86yo M who had a transperineal biopsy with Dr. Mendieta on 3/9/23 presenting with rigors, subjective fevers, and malaise at home since 9pm yesterday. Patient also endorsing hematuria since the procedure. In the emergency room patient had labs which were significant for leukocytosis (15) and vital signs significant for tachycardia to 120. Patient denies any other urinary symptoms including frequency, urgency, dysuria and retention.  (10 Mar 2023 16:47)      Consulted for: DM2 Management  This is an 86 yo M /w a PMh of DM2 who presents for fevers after recent transperineal biopsy. Endocrinology consulted for management of DM2    Patient uses V-GO with novolog insulin. He takes 20 units basal and 6 units pre-meal. This needs to be refilled daily. Takes metformin 750mg ER daily    Patient and son at bedside agreeable to stop V-GO and transition to basal-bolus  Patient has DM2 since 1956. Denies prior complications of nephropathy, retinopathy, or neuropathy. Reports A1c when he last saw his endocrinologist Dr. Riggs was 8%.    Patient reports he rarely gets hypoglycemia. Occurs only if he does more exercise than usual. He typically walks about a mile a day but son reports sometimes patient walks 3 miles and in those instances is higher risk of hypoglycemia. FSG in the mornings range around 120s, but can go as high as 200 later in the day    PAST MEDICAL & SURGICAL HISTORY:  Diabetes      Coronary artery disease      S/P TURP      S/P inguinal hernia repair      S/P coronary artery stent placement          FAMILY HISTORY:  No pertinent family history in first degree relatives        Social History: denies all toxic habits    Home Medications:  aspirin 81 mg oral tablet:  (10 Mar 2023 17:08)  insulin aspart 100 units/mL subcutaneous solution: 6 unit(s) subcutaneous 3 times a day (before meals) (10 Mar 2023 17:08)  Lantus 100 units/mL subcutaneous solution: 20 unit(s) subcutaneous once a day (10 Mar 2023 17:08)  lisinopril 5 mg oral tablet: 1 tab(s) orally once a day (10 Mar 2023 17:08)  metFORMIN 750 mg oral tablet, extended release: 1 tab(s) orally once a day (10 Mar 2023 17:08)  simvastatin 20 mg oral tablet: 1 tab(s) orally once a day (at bedtime) (10 Mar 2023 17:08)  tamsulosin 0.4 mg oral capsule: 1 cap(s) orally once a day (10 Mar 2023 17:08)      MEDICATIONS  (STANDING):  acetaminophen     Tablet .. 1000 milliGRAM(s) Oral every 6 hours  aspirin  chewable 81 milliGRAM(s) Oral daily  dextrose 5%. 1000 milliLiter(s) (100 mL/Hr) IV Continuous <Continuous>  dextrose 5%. 1000 milliLiter(s) (50 mL/Hr) IV Continuous <Continuous>  dextrose 50% Injectable 25 Gram(s) IV Push once  dextrose 50% Injectable 12.5 Gram(s) IV Push once  dextrose 50% Injectable 25 Gram(s) IV Push once  glucagon  Injectable 1 milliGRAM(s) IntraMuscular once  heparin   Injectable 5000 Unit(s) SubCutaneous every 8 hours  insulin lispro (ADMELOG) corrective regimen sliding scale   SubCutaneous Before meals and at bedtime  insulin lispro Injectable (ADMELOG) 5 Unit(s) SubCutaneous before breakfast  insulin lispro Injectable (ADMELOG) 5 Unit(s) SubCutaneous before lunch  insulin lispro Injectable (ADMELOG) 5 Unit(s) SubCutaneous before dinner  lisinopril 5 milliGRAM(s) Oral daily  simvastatin 20 milliGRAM(s) Oral at bedtime  tamsulosin 0.4 milliGRAM(s) Oral at bedtime    MEDICATIONS  (PRN):  dextrose Oral Gel 15 Gram(s) Oral once PRN Blood Glucose LESS THAN 70 milliGRAM(s)/deciliter      Allergies    No Known Allergies    Intolerances      Review of Systems:  Constitutional: No fever  Eyes: No blurry vision  Neuro: No tremors  HEENT: No pain  Cardiovascular: No chest pain, palpitations  Respiratory: No SOB, no cough  GI: No nausea, vomiting, abdominal pain  : No dysuria  Skin: no rash  Psych: no depression  Endocrine: no polyuria, polydipsia  Hem/lymph: no swelling  Osteoporosis: no fractures    PHYSICAL EXAM:  VITALS: T(C): 37.9 (03-11-23 @ 13:23)  T(F): 100.2 (03-11-23 @ 13:23), Max: 100.2 (03-11-23 @ 13:23)  HR: 88 (03-11-23 @ 13:23) (66 - 96)  BP: 136/55 (03-11-23 @ 13:23) (115/41 - 144/58)  RR:  (17 - 20)  SpO2:  (96% - 100%)  Wt(kg): --  GENERAL: NAD  EYES: No proptosis, no lid lag, anicteric  THYROID: Normal size, no palpable nodules  RESPIRATORY: Clear to auscultation bilaterally  CARDIOVASCULAR: Regular rate and rhythm  GI: Soft, nontender, non distended  EXT: b/l feet without wounds; 2+ pulses  PSYCH: Alert and oriented x 3, reactive mood    POCT Blood Glucose.: 287 mg/dL (03-11-23 @ 11:32)  POCT Blood Glucose.: 288 mg/dL (03-11-23 @ 07:11)  POCT Blood Glucose.: 209 mg/dL (03-10-23 @ 20:57)  POCT Blood Glucose.: 236 mg/dL (03-10-23 @ 19:03)                            11.0   9.94  )-----------( 288      ( 11 Mar 2023 05:28 )             36.7       03-11    135  |  104  |  25<H>  ----------------------------<  314<H>  4.6   |  24  |  1.51<H>    eGFR: 45<L>    Ca    8.3<L>      03-11    TPro  6.6  /  Alb  3.5  /  TBili  1.2  /  DBili  x   /  AST  22  /  ALT  15  /  AlkPhos  71  03-10      Thyroid Function Tests:      A1C with Estimated Average Glucose Result: 8.0 % (03-11-23 @ 05:28)          Radiology:

## 2023-03-13 DIAGNOSIS — R93.5 ABNORMAL FINDINGS ON DIAGNOSTIC IMAGING OF OTHER ABDOMINAL REGIONS, INCLUDING RETROPERITONEUM: ICD-10-CM

## 2023-03-13 DIAGNOSIS — R97.20 ELEVATED PROSTATE SPECIFIC ANTIGEN [PSA]: ICD-10-CM

## 2023-03-15 LAB
CULTURE RESULTS: SIGNIFICANT CHANGE UP
CULTURE RESULTS: SIGNIFICANT CHANGE UP
PROSTATE BIOPSY: NORMAL
SPECIMEN SOURCE: SIGNIFICANT CHANGE UP
SPECIMEN SOURCE: SIGNIFICANT CHANGE UP

## 2023-03-17 PROBLEM — E11.9 TYPE 2 DIABETES MELLITUS WITHOUT COMPLICATIONS: Chronic | Status: ACTIVE | Noted: 2023-03-10

## 2023-03-17 PROBLEM — I25.10 ATHEROSCLEROTIC HEART DISEASE OF NATIVE CORONARY ARTERY WITHOUT ANGINA PECTORIS: Chronic | Status: ACTIVE | Noted: 2023-03-10

## 2023-03-29 ENCOUNTER — APPOINTMENT (OUTPATIENT)
Dept: UROLOGY | Facility: CLINIC | Age: 88
End: 2023-03-29

## 2023-04-07 ENCOUNTER — APPOINTMENT (OUTPATIENT)
Dept: UROLOGY | Facility: CLINIC | Age: 88
End: 2023-04-07
Payer: MEDICARE

## 2023-04-07 VITALS
SYSTOLIC BLOOD PRESSURE: 146 MMHG | WEIGHT: 155 LBS | TEMPERATURE: 97.5 F | HEART RATE: 65 BPM | BODY MASS INDEX: 26.46 KG/M2 | OXYGEN SATURATION: 98 % | DIASTOLIC BLOOD PRESSURE: 58 MMHG | HEIGHT: 64 IN

## 2023-04-07 DIAGNOSIS — N40.0 BENIGN PROSTATIC HYPERPLASIA WITHOUT LOWER URINARY TRACT SYMPMS: ICD-10-CM

## 2023-04-07 DIAGNOSIS — R97.20 ELEVATED PROSTATE, SPECIFIC ANTIGEN [PSA]: ICD-10-CM

## 2023-04-07 PROCEDURE — 99215 OFFICE O/P EST HI 40 MIN: CPT

## 2023-04-07 NOTE — DISEASE MANAGEMENT
[1] : T1 [c] : c [X] : MX [10-20] : 10 - 20 ng/mL [Biopsy with Fusion] : Patient had a biopsy with fusion on [8] : Fusion Biopsy Shadi Score: 8 [] : Patient had a Prostate MRI [4] : 4 [IIC] : IIC [BiopsyDate] : 03/23 [TotalCores] : 13 [TotalPositiveCores] : 2 [MaxCoreInvolvement] : 100

## 2023-04-07 NOTE — HISTORY OF PRESENT ILLNESS
[FreeTextEntry1] : Very pleasant 88-year-old gentleman who presents for follow-up of elevated PSA, new diagnosis of prostate cancer.  He was found to have an elevated PSA of 10.7 from September 2022.  A urine culture was positive at that time.  He was treated with antibiotics.  Repeat urine culture was negative.  Subsequent prostate MRI demonstrated an overall suspicion score of PI-RADS 4 in the setting of a 54 cc prostate.  Because of this he underwent a prostate biopsy which demonstrated Shadi group grade 4 prostate cancer at the site of the target lesion as well as another site.  He presents today to discuss these results as well as further management options.

## 2023-04-26 ENCOUNTER — APPOINTMENT (OUTPATIENT)
Dept: UROLOGY | Facility: CLINIC | Age: 88
End: 2023-04-26

## 2023-05-02 ENCOUNTER — OUTPATIENT (OUTPATIENT)
Dept: OUTPATIENT SERVICES | Facility: HOSPITAL | Age: 88
LOS: 1 days | End: 2023-05-02
Payer: COMMERCIAL

## 2023-05-02 ENCOUNTER — APPOINTMENT (OUTPATIENT)
Dept: NUCLEAR MEDICINE | Facility: IMAGING CENTER | Age: 88
End: 2023-05-02
Payer: MEDICARE

## 2023-05-02 DIAGNOSIS — Z98.890 OTHER SPECIFIED POSTPROCEDURAL STATES: Chronic | ICD-10-CM

## 2023-05-02 DIAGNOSIS — C61 MALIGNANT NEOPLASM OF PROSTATE: ICD-10-CM

## 2023-05-02 DIAGNOSIS — Z90.79 ACQUIRED ABSENCE OF OTHER GENITAL ORGAN(S): Chronic | ICD-10-CM

## 2023-05-02 DIAGNOSIS — Z95.5 PRESENCE OF CORONARY ANGIOPLASTY IMPLANT AND GRAFT: Chronic | ICD-10-CM

## 2023-05-02 PROCEDURE — 78816 PET IMAGE W/CT FULL BODY: CPT | Mod: 26

## 2023-05-02 PROCEDURE — 78816 PET IMAGE W/CT FULL BODY: CPT

## 2023-05-02 PROCEDURE — A9595: CPT

## 2023-06-27 ENCOUNTER — APPOINTMENT (OUTPATIENT)
Dept: UROLOGY | Facility: CLINIC | Age: 88
End: 2023-06-27
Payer: MEDICARE

## 2023-06-27 VITALS
TEMPERATURE: 97.9 F | DIASTOLIC BLOOD PRESSURE: 57 MMHG | OXYGEN SATURATION: 98 % | HEIGHT: 64 IN | RESPIRATION RATE: 16 BRPM | SYSTOLIC BLOOD PRESSURE: 131 MMHG | WEIGHT: 155 LBS | HEART RATE: 67 BPM | BODY MASS INDEX: 26.46 KG/M2

## 2023-06-27 DIAGNOSIS — N28.89 OTHER SPECIFIED DISORDERS OF KIDNEY AND URETER: ICD-10-CM

## 2023-06-27 DIAGNOSIS — C61 MALIGNANT NEOPLASM OF PROSTATE: ICD-10-CM

## 2023-06-27 PROCEDURE — 99215 OFFICE O/P EST HI 40 MIN: CPT

## 2023-06-27 NOTE — HISTORY OF PRESENT ILLNESS
[FreeTextEntry1] : Very pleasant 88 year old gentleman who presents for follow-up of prostate cancer, new finding of left renal mass on PSMA PET scan.  He recently underwent a PSMA PET scan for evaluation of Shadi group grade 4 prostate cancer.  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  It demonstrated no evidence of PSMA positive metastatic disease.  He reports no problems over the last 2 months.  He presents today to discuss these results as well as further management options.

## 2023-07-25 ENCOUNTER — APPOINTMENT (OUTPATIENT)
Dept: UROLOGY | Facility: CLINIC | Age: 88
End: 2023-07-25